# Patient Record
Sex: FEMALE | Race: BLACK OR AFRICAN AMERICAN | NOT HISPANIC OR LATINO | URBAN - METROPOLITAN AREA
[De-identification: names, ages, dates, MRNs, and addresses within clinical notes are randomized per-mention and may not be internally consistent; named-entity substitution may affect disease eponyms.]

---

## 2021-10-15 ENCOUNTER — TELEPHONE ENCOUNTER (OUTPATIENT)
Dept: URBAN - METROPOLITAN AREA CLINIC 113 | Facility: CLINIC | Age: 66
End: 2021-10-15

## 2021-10-15 VITALS — WEIGHT: 243 LBS | BODY MASS INDEX: 41.48 KG/M2 | HEIGHT: 64 IN

## 2021-10-15 RX ORDER — POLYETHYLENE GLYCOL 3350, SODIUM SULFATE ANHYDROUS, SODIUM BICARBONATE, SODIUM CHLORIDE, POTASSIUM CHLORIDE 236; 22.74; 6.74; 5.86; 2.97 G/4L; G/4L; G/4L; G/4L; G/4L
AS DIRECTED POWDER, FOR SOLUTION ORAL
Qty: 4000 MILLILITER | Refills: 0 | OUTPATIENT
Start: 2021-10-19 | End: 2021-10-20

## 2021-10-15 RX ORDER — LISINOPRIL AND HYDROCHLOROTHIAZIDE 20; 25 MG/1; MG/1
1 TABLET TABLET ORAL ONCE A DAY
Status: ACTIVE | COMMUNITY

## 2021-10-20 ENCOUNTER — TELEPHONE ENCOUNTER (OUTPATIENT)
Dept: URBAN - METROPOLITAN AREA CLINIC 113 | Facility: CLINIC | Age: 66
End: 2021-10-20

## 2021-10-20 RX ORDER — POLYETHYLENE GLYCOL 3350, SODIUM CHLORIDE, SODIUM BICARBONATE AND POTASSIUM CHLORIDE 420G
AS DIRECTED KIT ORAL ONCE
Qty: 420 GRAM | Refills: 0 | OUTPATIENT
Start: 2021-10-21 | End: 2021-10-22

## 2021-10-21 ENCOUNTER — LAB OUTSIDE AN ENCOUNTER (OUTPATIENT)
Dept: URBAN - METROPOLITAN AREA CLINIC 113 | Facility: CLINIC | Age: 66
End: 2021-10-21

## 2021-10-27 ENCOUNTER — TELEPHONE ENCOUNTER (OUTPATIENT)
Dept: URBAN - METROPOLITAN AREA SURGERY CENTER 25 | Facility: SURGERY CENTER | Age: 66
End: 2021-10-27

## 2021-11-12 ENCOUNTER — OFFICE VISIT (OUTPATIENT)
Dept: URBAN - METROPOLITAN AREA SURGERY CENTER 25 | Facility: SURGERY CENTER | Age: 66
End: 2021-11-12
Payer: COMMERCIAL

## 2021-11-12 ENCOUNTER — CLAIMS CREATED FROM THE CLAIM WINDOW (OUTPATIENT)
Dept: URBAN - METROPOLITAN AREA CLINIC 4 | Facility: CLINIC | Age: 66
End: 2021-11-12
Payer: COMMERCIAL

## 2021-11-12 DIAGNOSIS — R19.5 FECES CONTENTS ABNORMAL. + COLOGUARD: ICD-10-CM

## 2021-11-12 DIAGNOSIS — D12.4 BENIGN NEOPLASM OF DESCENDING COLON: ICD-10-CM

## 2021-11-12 DIAGNOSIS — D12.4 ADENOMA OF DESCENDING COLON: ICD-10-CM

## 2021-11-12 PROCEDURE — 88305 TISSUE EXAM BY PATHOLOGIST: CPT | Performed by: PATHOLOGY

## 2021-11-12 PROCEDURE — G8907 PT DOC NO EVENTS ON DISCHARG: HCPCS | Performed by: INTERNAL MEDICINE

## 2021-11-12 PROCEDURE — 45385 COLONOSCOPY W/LESION REMOVAL: CPT | Performed by: INTERNAL MEDICINE

## 2021-11-12 PROCEDURE — 45381 COLONOSCOPY SUBMUCOUS NJX: CPT | Performed by: INTERNAL MEDICINE

## 2021-11-12 RX ORDER — LISINOPRIL AND HYDROCHLOROTHIAZIDE 20; 25 MG/1; MG/1
1 TABLET TABLET ORAL ONCE A DAY
Status: ACTIVE | COMMUNITY

## 2021-12-10 ENCOUNTER — OFFICE VISIT (OUTPATIENT)
Dept: URBAN - METROPOLITAN AREA CLINIC 113 | Facility: CLINIC | Age: 66
End: 2021-12-10
Payer: COMMERCIAL

## 2021-12-10 ENCOUNTER — LAB OUTSIDE AN ENCOUNTER (OUTPATIENT)
Dept: URBAN - METROPOLITAN AREA CLINIC 113 | Facility: CLINIC | Age: 66
End: 2021-12-10

## 2021-12-10 VITALS
RESPIRATION RATE: 18 BRPM | SYSTOLIC BLOOD PRESSURE: 153 MMHG | HEART RATE: 72 BPM | WEIGHT: 246 LBS | TEMPERATURE: 97.8 F | BODY MASS INDEX: 42 KG/M2 | HEIGHT: 64 IN | DIASTOLIC BLOOD PRESSURE: 86 MMHG

## 2021-12-10 DIAGNOSIS — D12.4 ADENOMATOUS POLYP OF DESCENDING COLON: ICD-10-CM

## 2021-12-10 PROCEDURE — 99213 OFFICE O/P EST LOW 20 MIN: CPT | Performed by: NURSE PRACTITIONER

## 2021-12-10 RX ORDER — LISINOPRIL AND HYDROCHLOROTHIAZIDE 20; 25 MG/1; MG/1
1 TABLET TABLET ORAL ONCE A DAY
Status: ACTIVE | COMMUNITY

## 2021-12-10 RX ORDER — POLYETHYLENE GLYCOL 3350, SODIUM CHLORIDE, SODIUM BICARBONATE, POTASSIUM CHLORIDE 420; 11.2; 5.72; 1.48 G/4L; G/4L; G/4L; G/4L
420 G POWDER, FOR SOLUTION ORAL ONCE
Qty: 420 G | Refills: 0 | OUTPATIENT
Start: 2021-12-10 | End: 2021-12-11

## 2021-12-10 NOTE — HPI-TODAY'S VISIT:
65 yo woman presenting for follow up after a colonoscopy performed for positive Cologuard test.  Colonoscopy was performed on 11/12/21. Good prep. Normal ileum. Nonbleeding internal hemorrhoids. Diverticulosis in the sigmoid colon and descending colon. Removal ofa 25 mm polyp from the descending colon using piecemeal technique. Pathology showed adenomatous tissue. A repeat colonoscopy is recommended in 3 months as part of colon cancer prevention.   Patient is without any abdominal complaints. No dysphagia, heartburn, regurgitation, unintentional weight loss, nausea, vomiting, hematemesis or melena. Bowels are moving regularly without blood per rectum. No complaints of bloating. No jaundice, icterus.

## 2022-02-18 ENCOUNTER — OFFICE VISIT (OUTPATIENT)
Dept: URBAN - METROPOLITAN AREA SURGERY CENTER 25 | Facility: SURGERY CENTER | Age: 67
End: 2022-02-18
Payer: COMMERCIAL

## 2022-02-18 ENCOUNTER — CLAIMS CREATED FROM THE CLAIM WINDOW (OUTPATIENT)
Dept: URBAN - METROPOLITAN AREA CLINIC 4 | Facility: CLINIC | Age: 67
End: 2022-02-18
Payer: COMMERCIAL

## 2022-02-18 DIAGNOSIS — D12.2 BENIGN NEOPLASM OF ASCENDING COLON: ICD-10-CM

## 2022-02-18 DIAGNOSIS — Z86.010 ADENOMAS PERSONAL HISTORY OF COLONIC POLYPS: ICD-10-CM

## 2022-02-18 DIAGNOSIS — D12.2 ADENOMA OF ASCENDING COLON: ICD-10-CM

## 2022-02-18 PROCEDURE — 88305 TISSUE EXAM BY PATHOLOGIST: CPT | Performed by: PATHOLOGY

## 2022-02-18 PROCEDURE — G8907 PT DOC NO EVENTS ON DISCHARG: HCPCS | Performed by: INTERNAL MEDICINE

## 2022-02-18 PROCEDURE — 45385 COLONOSCOPY W/LESION REMOVAL: CPT | Performed by: INTERNAL MEDICINE

## 2022-02-18 RX ORDER — LISINOPRIL AND HYDROCHLOROTHIAZIDE 20; 25 MG/1; MG/1
1 TABLET TABLET ORAL ONCE A DAY
Status: ACTIVE | COMMUNITY

## 2022-03-29 ENCOUNTER — OFFICE VISIT (OUTPATIENT)
Dept: URBAN - METROPOLITAN AREA CLINIC 113 | Facility: CLINIC | Age: 67
End: 2022-03-29

## 2022-04-14 ENCOUNTER — WEB ENCOUNTER (OUTPATIENT)
Dept: URBAN - METROPOLITAN AREA CLINIC 113 | Facility: CLINIC | Age: 67
End: 2022-04-14

## 2022-04-20 ENCOUNTER — CLAIMS CREATED FROM THE CLAIM WINDOW (OUTPATIENT)
Dept: URBAN - METROPOLITAN AREA CLINIC 113 | Facility: CLINIC | Age: 67
End: 2022-04-20
Payer: COMMERCIAL

## 2022-04-20 ENCOUNTER — DASHBOARD ENCOUNTERS (OUTPATIENT)
Age: 67
End: 2022-04-20

## 2022-04-20 VITALS
WEIGHT: 251 LBS | BODY MASS INDEX: 42.85 KG/M2 | HEIGHT: 64 IN | TEMPERATURE: 98.2 F | HEART RATE: 71 BPM | DIASTOLIC BLOOD PRESSURE: 94 MMHG | SYSTOLIC BLOOD PRESSURE: 199 MMHG

## 2022-04-20 DIAGNOSIS — D12.2 ADENOMATOUS POLYP OF ASCENDING COLON: ICD-10-CM

## 2022-04-20 DIAGNOSIS — D12.4 ADENOMATOUS POLYP OF DESCENDING COLON: ICD-10-CM

## 2022-04-20 PROCEDURE — 99213 OFFICE O/P EST LOW 20 MIN: CPT | Performed by: NURSE PRACTITIONER

## 2022-04-20 RX ORDER — LISINOPRIL AND HYDROCHLOROTHIAZIDE 20; 25 MG/1; MG/1
1 TABLET TABLET ORAL ONCE A DAY
Status: ACTIVE | COMMUNITY

## 2022-04-20 NOTE — HPI-TODAY'S VISIT:
65 yo with a history of HTN, fatty liver, MVP, HLD, arthritis, and adenomatous colon polyps, presenting for follow up.  She was seen in the office in December 2021 following a colonoscopy notable for the piecemeal removal  of a 25 mm adenoma from the descending colon. A repeat colonoscopy was recommended in 3 months.  A colonoscopy was performed on 2/18/22. Good prep. Normal examined ileum. Removal fo a 6 mm tubular adenoma  from the ascending colon, nonbleeding internal hemorrhoids, and diverticulosis in the sigmoid and descending colon.   She is without any abdominal complaints. No dysphagia, heartburn, regurgitation, unintentional weight loss, nausea, vomiting, hematemesis or melena. Bowels are moving regularly without blood per rectum. No complaints of bloating. No jaundice, icterus.

## 2024-09-25 ENCOUNTER — LAB OUTSIDE AN ENCOUNTER (OUTPATIENT)
Dept: URBAN - METROPOLITAN AREA CLINIC 113 | Facility: CLINIC | Age: 69
End: 2024-09-25

## 2024-09-25 ENCOUNTER — OFFICE VISIT (OUTPATIENT)
Dept: URBAN - METROPOLITAN AREA CLINIC 113 | Facility: CLINIC | Age: 69
End: 2024-09-25
Payer: COMMERCIAL

## 2024-09-25 VITALS
HEART RATE: 65 BPM | WEIGHT: 251.2 LBS | TEMPERATURE: 97 F | DIASTOLIC BLOOD PRESSURE: 89 MMHG | RESPIRATION RATE: 12 BRPM | SYSTOLIC BLOOD PRESSURE: 178 MMHG | BODY MASS INDEX: 42.88 KG/M2 | HEIGHT: 64 IN

## 2024-09-25 DIAGNOSIS — D12.4 ADENOMATOUS POLYP OF DESCENDING COLON: ICD-10-CM

## 2024-09-25 PROCEDURE — 99213 OFFICE O/P EST LOW 20 MIN: CPT | Performed by: NURSE PRACTITIONER

## 2024-09-25 RX ORDER — PANTOPRAZOLE SODIUM 20 MG/1
1 TABLET TABLET, DELAYED RELEASE ORAL ONCE A DAY
Status: ACTIVE | COMMUNITY

## 2024-09-25 RX ORDER — AMLODIPINE BESYLATE 10 MG/1
1 TABLET TABLET ORAL ONCE A DAY
Status: ACTIVE | COMMUNITY

## 2024-09-25 RX ORDER — SPIRONOLACTONE 100 MG/1
1 TABLET TABLET, FILM COATED ORAL ONCE A DAY
Status: ACTIVE | COMMUNITY

## 2024-09-25 RX ORDER — LISINOPRIL AND HYDROCHLOROTHIAZIDE 20; 25 MG/1; MG/1
1 TABLET TABLET ORAL ONCE A DAY
Status: ACTIVE | COMMUNITY

## 2024-09-25 NOTE — HPI-TODAY'S VISIT:
70 yo woman with a history of HTN, fatty liver, MVP, HLD, arthritis, and adenomatous colon polyps, presenting to discuss a colonoscopy in the setting of a history of colon polyps.  Her last colonoscopy on 2/18/22 notable for a good prep, normal examined ileum, removal of a 6 mm tubular adenoma from the ascending colon, nonbleeding internal hemorrhoids, and diverticulosis in the sigmoid and descending colon. Her colonoscopy in 2021 was notable for the piecemeal removal of a 25 mm adenoma from the descending colon.  She is without any abdominal complaints. No dysphagia, heartburn, regurgitation, unintentional weight loss, nausea, vomiting, hematemesis or melena. Bowels are moving regularly without blood per rectum. No complaints of bloating. No jaundice, icterus.